# Patient Record
Sex: MALE | Race: BLACK OR AFRICAN AMERICAN | NOT HISPANIC OR LATINO | Employment: STUDENT | URBAN - METROPOLITAN AREA
[De-identification: names, ages, dates, MRNs, and addresses within clinical notes are randomized per-mention and may not be internally consistent; named-entity substitution may affect disease eponyms.]

---
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
## 2025-01-02 DIAGNOSIS — G24.8 PAROXYSMAL DYSKINESIA: ICD-10-CM

## 2025-01-02 RX ORDER — CARBAMAZEPINE 100 MG/5ML
SUSPENSION ORAL
Qty: 315 ML | Refills: 5 | Status: SHIPPED | OUTPATIENT
Start: 2025-01-02

## 2025-01-13 ENCOUNTER — PATIENT MESSAGE (OUTPATIENT)
Dept: PEDIATRIC NEUROLOGY | Facility: CLINIC | Age: 20
End: 2025-01-13
Payer: MEDICAID

## 2025-01-13 DIAGNOSIS — G24.8 PAROXYSMAL DYSKINESIA: ICD-10-CM

## 2025-01-13 RX ORDER — CARBAMAZEPINE 100 MG/5ML
5 SUSPENSION ORAL 2 TIMES DAILY
Qty: 315 ML | Refills: 0 | Status: SHIPPED | OUTPATIENT
Start: 2025-01-13

## 2025-01-14 ENCOUNTER — PATIENT MESSAGE (OUTPATIENT)
Dept: PEDIATRIC NEUROLOGY | Facility: CLINIC | Age: 20
End: 2025-01-14
Payer: MEDICAID

## 2025-01-14 DIAGNOSIS — G40.109 FOCAL EPILEPSY: ICD-10-CM

## 2025-01-14 DIAGNOSIS — G24.8 PAROXYSMAL DYSKINESIA: Primary | ICD-10-CM

## 2025-02-05 ENCOUNTER — TELEPHONE (OUTPATIENT)
Facility: CLINIC | Age: 20
End: 2025-02-05
Payer: MEDICAID

## 2025-02-05 NOTE — TELEPHONE ENCOUNTER
Spoke to patient mother to schedule appointment virtual on 2/6 at 3:30pm Patient verbalized agreement

## 2025-02-06 ENCOUNTER — OFFICE VISIT (OUTPATIENT)
Dept: NEUROLOGY | Facility: CLINIC | Age: 20
End: 2025-02-06
Payer: MEDICAID

## 2025-02-06 DIAGNOSIS — G40.109 FOCAL EPILEPSY: ICD-10-CM

## 2025-02-06 DIAGNOSIS — G24.8 PAROXYSMAL DYSKINESIA: ICD-10-CM

## 2025-02-06 PROCEDURE — 98005 SYNCH AUDIO-VIDEO EST LOW 20: CPT | Mod: 95,,, | Performed by: PSYCHIATRY & NEUROLOGY

## 2025-02-07 NOTE — PROGRESS NOTES
"The patient location is: home  The chief complaint leading to consultation is: epilepsy    Visit type: audiovisual    Face to Face time with patient: 40 minutes of total time spent on the encounter, which includes face to face time and non-face to face time preparing to see the patient (eg, review of tests), Obtaining and/or reviewing separately obtained history, Documenting clinical information in the electronic or other health record, Independently interpreting results (not separately reported) and communicating results to the patient/family/caregiver, or Care coordination (not separately reported).     Each patient to whom he or she provides medical services by telemedicine is:  (1) informed of the relationship between the physician and patient and the respective role of any other health care provider with respect to management of the patient; and (2) notified that he or she may decline to receive medical services by telemedicine and may withdraw from such care at any time.    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  Family accompanies him for this virtual visit and indicated that he has been seizure free since   2021.   As per chart review :First seizure- walking to the car- [place] were leaving for [place]- stopped in [place] to get something to eat. Grandmother walked him to the car, she thought he looked off. As he was getting into the car he began to have a seizure-eyes rolling, foaming, he was not responding, grandmother reports stiffness. Grandmother thinks 4-5 minutes. He slept afterwards.  They had driven 1.5 hrs, and he had another seizure in the car; and they went to the nearest ER in [place].  At the same time, they stated that he was having the " " "abnormal episodes of "tightening" movements during the summer because he was not taking his medication correctly but after resuming the twice a day regimen he hasn't had more of these movements".  Has been on tegretol 100mg BID is working well in liquid version and is not causing ostensible side effects. Tried Keppra before.  Seizure work up:  MRI brain 12/20/21: Complex intracerebral anomalies. 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 The right frontal lobe is enlarged and interdigitates with the left frontal lobe. Agenesis of the corpus callosum. 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 No abnormal mass.   EEG 1/9/24: focal slowing occipital regions. No epileptiform discharges.  Today, they report no neurological developments.    Physical and Neuro exam: unable to perform.    Assessment and Plan:   1) PHONG with focal epilepsy and secondary generalization: seizure free since 2021 on Carbamazepine 100 mg BID liquid form  2) Paroxysmal dyskinesia, controlled with carbamazepine  3) ADHD  4) Autistic spectrum disorder            "

## 2025-08-28 DIAGNOSIS — G24.8 PAROXYSMAL DYSKINESIA: ICD-10-CM

## 2025-08-28 RX ORDER — CARBAMAZEPINE 100 MG/5ML
5 SUSPENSION ORAL 2 TIMES DAILY
Qty: 315 ML | Refills: 5 | Status: SHIPPED | OUTPATIENT
Start: 2025-08-28